# Patient Record
Sex: FEMALE | Race: WHITE | NOT HISPANIC OR LATINO | URBAN - METROPOLITAN AREA
[De-identification: names, ages, dates, MRNs, and addresses within clinical notes are randomized per-mention and may not be internally consistent; named-entity substitution may affect disease eponyms.]

---

## 2023-11-29 ENCOUNTER — TRANSFERRED RECORDS (OUTPATIENT)
Dept: HEALTH INFORMATION MANAGEMENT | Facility: CLINIC | Age: 58
End: 2023-11-29

## 2023-12-13 ENCOUNTER — TRANSFERRED RECORDS (OUTPATIENT)
Dept: HEALTH INFORMATION MANAGEMENT | Facility: CLINIC | Age: 58
End: 2023-12-13
Payer: COMMERCIAL

## 2023-12-18 NOTE — TELEPHONE ENCOUNTER
Action December 17, 2023 9:03 PM MT   Action Taken Sent a request for records and imaging from [facility].   Sent the MRI radiology report from [facility] to scan, imaging done with [facility].       DIAGNOSIS: Left Knee   APPOINTMENT DATE: 12/20/2023   NOTES STATUS DETAILS   OFFICE NOTE from referring provider Media Tab 11/29/2023 - Devendra Dawn MD - [facility]   OFFICE NOTE from other specialist Media Tab 10/16/2023 - Ilda Champagne MD- [facility]   MEDICATION LIST Media Tab    MRI PACS [facility]:  12/13/2023 - LT Knee   XRAYS (IMAGES & REPORTS) PACS [facility]:  01/09/2023 - Left Foot  01/09/2023 - Left Knee

## 2023-12-20 ENCOUNTER — OFFICE VISIT (OUTPATIENT)
Dept: ORTHOPEDICS | Facility: CLINIC | Age: 58
End: 2023-12-20
Payer: COMMERCIAL

## 2023-12-20 ENCOUNTER — PRE VISIT (OUTPATIENT)
Dept: ORTHOPEDICS | Facility: CLINIC | Age: 58
End: 2023-12-20

## 2023-12-20 VITALS — HEIGHT: 65 IN | BODY MASS INDEX: 25.99 KG/M2 | WEIGHT: 156 LBS

## 2023-12-20 DIAGNOSIS — D49.2 NERVE SHEATH TUMOR: Primary | ICD-10-CM

## 2023-12-20 PROCEDURE — 99204 OFFICE O/P NEW MOD 45 MIN: CPT | Performed by: ORTHOPAEDIC SURGERY

## 2023-12-20 RX ORDER — GABAPENTIN 300 MG/1
300 CAPSULE ORAL 3 TIMES DAILY
Qty: 90 CAPSULE | Refills: 1 | Status: SHIPPED | OUTPATIENT
Start: 2023-12-20

## 2023-12-20 NOTE — NURSING NOTE
Pre-Op Teaching was done in person at the clinic.    Teaching Flowsheet   Relevant Diagnosis: Pre-Op Teaching  Teaching Topic:      Person(s) involved in teaching:   Patient     Motivation Level:  Asks Questions: Yes  Eager to Learn: Yes  Cooperative: Yes  Receptive (willing/able to accept information): Yes  Any cultural factors/Muslim beliefs that may influence understanding or compliance? No     Patient demonstrates understanding of the following:  Reason for the appointment, diagnosis and treatment plan: Yes  Knowledge of proper use of medications and conditions for which they are ordered (with special attention to potential side effects or drug interactions): Yes  Which situations necessitate calling provider and whom to contact: Yes- discussed the stoplight tool to help assist with this.      Teaching Concerns Addressed:      Proper use of surgical scrub explain: Yes    Nutritional needs and diet plan: Yes  Pain management techniques: Yes  Wound Care: Yes  How and/when to access community resources: Yes     Instructional Materials Used/Given:  2 bottle of chlorhexidine and a surgery packet given to patient in clinic.      - Important contact info/ phone numbers: emphasizing clinic number and after hours number  - Map/ location of surgery  - Medications to avoid  - Showering instructions  - Stop light tool       -Next step: Schedule a surgery date and schedule a Pre-Op appointment with PCP     Time spent with patient: 15 minutes.

## 2023-12-20 NOTE — LETTER
"    12/20/2023         RE: Peter Verdugo  [address] [facility] [address]        Dear Colleague,    Thank you for referring your patient, Peter Verdugo, to the [facility] [place]. Please see a copy of my visit note below.    Chief Complaint: left leg pain and mass    History: Peter is a 58 year old female here with complaints of left lower leg pain and weakness for one year.  She denies any injury.  She states that the pain is shooting down the back and outside of her lower leg.  It keeps her awake several times a night.  She feels tingling down to the foot at times.  She feels that her leg and ankle are getting weak.  Her ankle occasionally gives out.  She has had to back off on her exercise program due to the symptoms.  She takes tylenol occasionally with minimal relief.  Stretching and moving the leg seem to help.  No other concerns.    PastMedHx: none    PastSurgHx: tonsillectomy; no difficulty with surgery or anesthesia    FamHx: none    Social History     Tobacco Use    Smoking status: Not on file    Smokeless tobacco: Not on file   Substance Use Topics    Alcohol use: Not on file     Meds:   Current Outpatient Medications   Medication    gabapentin (NEURONTIN) 300 MG capsule     No current facility-administered medications for this visit.     Allergies:    Allergies   Allergen Reactions    Aspirin Anaphylaxis    Bee Pollen Unknown     Patient uses  Epi Pen     Review of Systems:   ROS: 10 point ROS neg other than the symptoms noted above in the HPI.    Physical Exam: Ht 1.65 m (5' 4.96\")   Wt 70.8 kg (156 lb)   BMI 25.99 kg/m    Peter is a healthy appearing 58 year old woman who is alert and oriented and in no distress.  She has a minimally antalgic gait without gait assistance.  I am unable to palpate any mass posterior to the fibular head, but this is the area that is most painful for her.  She has no lower extremity edema.  Strength is 5/5 resisted knee flexion " and extension.  Resisted dorsiflexion is 4+/5 with pain.  Resisted plantarflexion is 4/5 with pain.  Resisted inversion is 4/5 with pain, and resisted eversion is 4+/5 with pain.  She is intact to light touch.    Imaging: MRI of the left knee without contrast was reviewed from an outside source.  This shows a 1.6 x 1.4 x 1.2 cm ovoid mass posterior and medial to the proximal tib-fib joint.  This has the appearance of a nerve sheath tumor.  It is adjacent to the vessels of the lower leg.  No associated edema.  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 The rest of the exam is unremarkable.    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  Obviously this has been very symptomatic for her, so we are recommending excision of the mass and sending it to pathology for diagnosis confirmation. This would be an outpatient procedure and she would be weight bearing as tolerated.  There is a chance of nerve injury or irritation which can cause temporary weakness, numbness or nerve pain.  Patient understands this.  There is also risk of artery injury, but we will be careful to visualize the vessels throughout the procedure.  Patient would like to proceed with the surgery.  She was given a packet today and has picked a date.  In the meantime, I have prescribed her gabapentin 300mg TID to use now and after the surgery.  If she has side effects, she can take 1 tab in the AM and 2 tabs at night, or just 2 tabs at night.  All questions answered.  Patient was also examined by Dr. Fitzpatrick and he agrees with the plan of care.     Chief Complaint: left leg pain and mass     History: Peter is a 58 year old female here with complaints of left lower leg pain and weakness for one year.  She denies any injury.  She states that the pain is shooting down the back and outside of her lower leg.  It keeps her awake several  "times a night.  She feels tingling down to the foot at times.  She feels that her leg and ankle are getting weak.  Her ankle occasionally gives out.  She has had to back off on her exercise program due to the symptoms.  She takes tylenol occasionally with minimal relief.  Stretching and moving the leg seem to help.  No other concerns.     PastMedHx: none     PastSurgHx: tonsillectomy; no difficulty with surgery or anesthesia     FamHx: none     Social History           Tobacco Use    Smoking status: Not on file    Smokeless tobacco: Not on file   Substance Use Topics    Alcohol use: Not on file      Meds:       Current Outpatient Medications   Medication    gabapentin (NEURONTIN) 300 MG capsule      No current facility-administered medications for this visit.      Allergies:          Allergies   Allergen Reactions    Aspirin Anaphylaxis    Bee Pollen Unknown       Patient uses  Epi Pen      Review of Systems:   ROS: 10 point ROS neg other than the symptoms noted above in the HPI.     Physical Exam: Ht 1.65 m (5' 4.96\")   Wt 70.8 kg (156 lb)   BMI 25.99 kg/m    Peter is a healthy appearing 58 year old woman who is alert and oriented and in no distress.  She has a minimally antalgic gait without gait assistance.  I am unable to palpate any mass posterior to the fibular head, but this is the area that is most painful for her.  She has no lower extremity edema.  Strength is 5/5 resisted knee flexion and extension.  Resisted dorsiflexion is 4+/5 with pain.  Resisted plantarflexion is 4/5 with pain.  Resisted inversion is 4/5 with pain, and resisted eversion is 4+/5 with pain.  She is intact to light touch.     Imaging: MRI of the left knee without contrast was reviewed from an outside source.  This shows a 1.6 x 1.4 x 1.2 cm ovoid mass posterior and medial to the proximal tib-fib joint.  This has the appearance of a nerve sheath tumor.  It is adjacent to the vessels of the lower leg.  No associated edema.  Grade 2/3 " chondromalacia patella noted. The rest of the exam is unremarkable.     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  Obviously this has been very symptomatic for her, so we are recommending excision of the mass and sending it to pathology for diagnosis confirmation. This would be an outpatient procedure and she would be weight bearing as tolerated.  There is a chance of nerve injury or irritation which can cause temporary weakness, numbness or nerve pain.  Patient understands this.  There is also risk of artery injury, but we will be careful to visualize the vessels throughout the procedure.  Additionally there are small risks of infection deep venous thrombosis and pulmonary embolism.  Patient would like to proceed with the surgery.  She was given a packet today and has picked a date.  In the meantime, I have prescribed her gabapentin 300mg TID to use now and after the surgery.  If she has side effects, she can take 1 tab in the AM and 2 tabs at night, or just 2 tabs at night.  All questions answered.      Andrea Fitzpatrick MD

## 2023-12-20 NOTE — PROGRESS NOTES
"Chief Complaint: left leg pain and mass    History: Peter is a 58 year old female here with complaints of left lower leg pain and weakness for one year.  She denies any injury.  She states that the pain is shooting down the back and outside of her lower leg.  It keeps her awake several times a night.  She feels tingling down to the foot at times.  She feels that her leg and ankle are getting weak.  Her ankle occasionally gives out.  She has had to back off on her exercise program due to the symptoms.  She takes tylenol occasionally with minimal relief.  Stretching and moving the leg seem to help.  No other concerns.    PastMedHx: none    PastSurgHx: tonsillectomy; no difficulty with surgery or anesthesia    FamHx: none    Social History     Tobacco Use    Smoking status: Not on file    Smokeless tobacco: Not on file   Substance Use Topics    Alcohol use: Not on file     Meds:   Current Outpatient Medications   Medication    gabapentin (NEURONTIN) 300 MG capsule     No current facility-administered medications for this visit.     Allergies:    Allergies   Allergen Reactions    Aspirin Anaphylaxis    Bee Pollen Unknown     Patient uses  Epi Pen     Review of Systems:   ROS: 10 point ROS neg other than the symptoms noted above in the HPI.    Physical Exam: Ht 1.65 m (5' 4.96\")   Wt 70.8 kg (156 lb)   BMI 25.99 kg/m    Peter is a healthy appearing 58 year old woman who is alert and oriented and in no distress.  She has a minimally antalgic gait without gait assistance.  I am unable to palpate any mass posterior to the fibular head, but this is the area that is most painful for her.  She has no lower extremity edema.  Strength is 5/5 resisted knee flexion and extension.  Resisted dorsiflexion is 4+/5 with pain.  Resisted plantarflexion is 4/5 with pain.  Resisted inversion is 4/5 with pain, and resisted eversion is 4+/5 with pain.  She is intact to light touch.    Imaging: MRI of the left knee without contrast was " reviewed from an outside source.  This shows a 1.6 x 1.4 x 1.2 cm ovoid mass posterior and medial to the proximal tib-fib joint.  This has the appearance of a nerve sheath tumor.  It is adjacent to the vessels of the lower leg.  No associated edema.  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 The rest of the exam is unremarkable.    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  Obviously this has been very symptomatic for her, so we are recommending excision of the mass and sending it to pathology for diagnosis confirmation. This would be an outpatient procedure and she would be weight bearing as tolerated.  There is a chance of nerve injury or irritation which can cause temporary weakness, numbness or nerve pain.  Patient understands this.  There is also risk of artery injury, but we will be careful to visualize the vessels throughout the procedure.  Patient would like to proceed with the surgery.  She was given a packet today and has picked a date.  In the meantime, I have prescribed her gabapentin 300mg TID to use now and after the surgery.  If she has side effects, she can take 1 tab in the AM and 2 tabs at night, or just 2 tabs at night.  All questions answered.  Patient was also examined by Dr. Fitzpatrick and he agrees with the plan of care.

## 2023-12-20 NOTE — NURSING NOTE
"Reason For Visit:   Chief Complaint   Patient presents with    Consult     Possible schwannoma of left knee referred by Dr. Jorge Dawn // first noticed pain about a year ago        Ht 1.65 m (5' 4.96\")   Wt 70.8 kg (156 lb)   BMI 25.99 kg/m      Pain Assessment  Patient Currently in Pain: Yes  0-10 Pain Scale: 7 (with standing or prolong sitting)  Primary Pain Location: Knee (left)      Chavo Miranda ATC    "

## 2023-12-20 NOTE — PROGRESS NOTES
"Chief Complaint: left leg pain and mass     History: Peter is a 58 year old female here with complaints of left lower leg pain and weakness for one year.  She denies any injury.  She states that the pain is shooting down the back and outside of her lower leg.  It keeps her awake several times a night.  She feels tingling down to the foot at times.  She feels that her leg and ankle are getting weak.  Her ankle occasionally gives out.  She has had to back off on her exercise program due to the symptoms.  She takes tylenol occasionally with minimal relief.  Stretching and moving the leg seem to help.  No other concerns.     PastMedHx: none     PastSurgHx: tonsillectomy; no difficulty with surgery or anesthesia     FamHx: none     Social History           Tobacco Use    Smoking status: Not on file    Smokeless tobacco: Not on file   Substance Use Topics    Alcohol use: Not on file      Meds:       Current Outpatient Medications   Medication    gabapentin (NEURONTIN) 300 MG capsule      No current facility-administered medications for this visit.      Allergies:          Allergies   Allergen Reactions    Aspirin Anaphylaxis    Bee Pollen Unknown       Patient uses  Epi Pen      Review of Systems:   ROS: 10 point ROS neg other than the symptoms noted above in the HPI.     Physical Exam: Ht 1.65 m (5' 4.96\")   Wt 70.8 kg (156 lb)   BMI 25.99 kg/m    Peter is a healthy appearing 58 year old woman who is alert and oriented and in no distress.  She has a minimally antalgic gait without gait assistance.  I am unable to palpate any mass posterior to the fibular head, but this is the area that is most painful for her.  She has no lower extremity edema.  Strength is 5/5 resisted knee flexion and extension.  Resisted dorsiflexion is 4+/5 with pain.  Resisted plantarflexion is 4/5 with pain.  Resisted inversion is 4/5 with pain, and resisted eversion is 4+/5 with pain.  She is intact to light touch.     Imaging: MRI of the left " knee without contrast was reviewed from an outside source.  This shows a 1.6 x 1.4 x 1.2 cm ovoid mass posterior and medial to the proximal tib-fib joint.  This has the appearance of a nerve sheath tumor.  It is adjacent to the vessels of the lower leg.  No associated edema.  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 The rest of the exam is unremarkable.     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  Obviously this has been very symptomatic for her, so we are recommending excision of the mass and sending it to pathology for diagnosis confirmation. This would be an outpatient procedure and she would be weight bearing as tolerated.  There is a chance of nerve injury or irritation which can cause temporary weakness, numbness or nerve pain.  Patient understands this.  There is also risk of artery injury, but we will be careful to visualize the vessels throughout the procedure.  Additionally there are small risks of infection deep venous thrombosis and pulmonary embolism.  Patient would like to proceed with the surgery.  She was given a packet today and has picked a date.  In the meantime, I have prescribed her gabapentin 300mg TID to use now and after the surgery.  If she has side effects, she can take 1 tab in the AM and 2 tabs at night, or just 2 tabs at night.  All questions answered.

## 2023-12-21 ENCOUNTER — TELEPHONE (OUTPATIENT)
Dept: ORTHOPEDICS | Facility: CLINIC | Age: 58
End: 2023-12-21
Payer: COMMERCIAL

## 2023-12-21 PROBLEM — D49.2 NERVE SHEATH TUMOR: Status: ACTIVE | Noted: 2023-12-20

## 2023-12-21 NOTE — TELEPHONE ENCOUNTER
Patient is scheduled for surgery with Dr. Fitzpatrick    Spoke with: Peter    Date of Surgery: 1/4/24    Location: [facility]    Informed patient they will need an adult  Yes    Pre op with Provider PCP, patient will schedule or call back to schedule w/PAC.     Additional imaging/appointments: POP Made    Surgery packet: Received     Additional comments: N/A        Leni Damon on 12/21/2023 at 3:36 PM

## 2023-12-21 NOTE — TELEPHONE ENCOUNTER
Phoned patient to get her scheduled for surgery with Dr. Fitzpatrick    Patient was unavailable,   Provided call back number in voicemail:   [phone] & [phone] for care team.   Will try again.

## 2024-01-02 ENCOUNTER — ANESTHESIA EVENT (OUTPATIENT)
Dept: SURGERY | Facility: AMBULATORY SURGERY CENTER | Age: 59
End: 2024-01-02
Payer: COMMERCIAL

## 2024-01-03 NOTE — ANESTHESIA PREPROCEDURE EVALUATION
"Anesthesia Pre-Procedure Evaluation    Patient: Peter Verdugo   MRN: [id number] : 1965        Procedure : Procedure(s):  Excision left leg mass          No past medical history on file.   No past surgical history on file.   Allergies   Allergen Reactions    Aspirin Anaphylaxis    Bee Pollen Unknown     Patient uses  Epi Pen      Social History     Tobacco Use    Smoking status: Not on file    Smokeless tobacco: Not on file   Substance Use Topics    Alcohol use: Not on file      Wt Readings from Last 1 Encounters:   23 70.8 kg (156 lb)        Anesthesia Evaluation            ROS/MED HX  ENT/Pulmonary:  - neg pulmonary ROS     Neurologic:  - neg neurologic ROS     Cardiovascular:  - neg cardiovascular ROS     METS/Exercise Tolerance: >4 METS    Hematologic:  - neg hematologic  ROS     Musculoskeletal:  - neg musculoskeletal ROS     GI/Hepatic:  - neg GI/hepatic ROS     Renal/Genitourinary:  - neg Renal ROS     Endo:  - neg endo ROS     Psychiatric/Substance Use:  - neg psychiatric ROS     Infectious Disease:  - neg infectious disease ROS     Malignancy:       Other:            Physical Exam    Airway  airway exam normal      Mallampati: I       Respiratory Devices and Support         Dental       (+) Minor Abnormalities - some fillings, tiny chips      Cardiovascular   cardiovascular exam normal          Pulmonary   pulmonary exam normal                OUTSIDE LABS:  CBC: No results found for: \"WBC\", \"HGB\", \"HCT\", \"PLT\"  BMP: No results found for: \"NA\", \"POTASSIUM\", \"CHLORIDE\", \"CO2\", \"BUN\", \"CR\", \"GLC\"  COAGS: No results found for: \"PTT\", \"INR\", \"FIBR\"  POC: No results found for: \"BGM\", \"HCG\", \"HCGS\"  HEPATIC: No results found for: \"ALBUMIN\", \"PROTTOTAL\", \"ALT\", \"AST\", \"GGT\", \"ALKPHOS\", \"BILITOTAL\", \"BILIDIRECT\", \"ODESSA\"  OTHER: No results found for: \"PH\", \"LACT\", \"A1C\", \"RUFUS\", \"PHOS\", \"MAG\", \"LIPASE\", \"AMYLASE\", \"TSH\", \"T4\", \"T3\", \"CRP\", \"SED\"    Anesthesia Plan    ASA Status:  1    NPO Status:  " "NPO Appropriate    Anesthesia Type: General.     - Airway: ETT   Induction: Intravenous, Propofol.   Maintenance: TIVA.        Consents    Anesthesia Plan(s) and associated risks, benefits, and realistic alternatives discussed. Questions answered and patient/representative(s) expressed understanding.     - Discussed: Risks, Benefits and Alternatives for the PROCEDURE were discussed     - Discussed with:  Patient            Postoperative Care    Pain management: IV analgesics, Oral pain medications, Multi-modal analgesia.   PONV prophylaxis: Ondansetron (or other 5HT-3), Dexamethasone or Solumedrol, Background Propofol Infusion     Comments:    Other Comments: Pt nervous and hypertensive today. /90 yesterday in clinic. Pt states that her BP is typically elevated when she goes to the doctor and/or has procedures. Plan to proceed today and treat hypertension as necessary.           Ty Jaffe MD    I have reviewed the pertinent notes and labs in the chart from the past 30 days and (re)examined the patient.  Any updates or changes from those notes are reflected in this note.              # Overweight: Estimated body mass index is 25.99 kg/m  as calculated from the following:    Height as of 12/20/23: 1.65 m (5' 4.96\").    Weight as of 12/20/23: 70.8 kg (156 lb).      "

## 2024-01-04 ENCOUNTER — HOSPITAL ENCOUNTER (OUTPATIENT)
Facility: AMBULATORY SURGERY CENTER | Age: 59
Discharge: HOME | End: 2024-01-04
Attending: ORTHOPAEDIC SURGERY
Payer: COMMERCIAL

## 2024-01-04 ENCOUNTER — ANESTHESIA (OUTPATIENT)
Dept: SURGERY | Facility: AMBULATORY SURGERY CENTER | Age: 59
End: 2024-01-04
Payer: COMMERCIAL

## 2024-01-04 VITALS
BODY MASS INDEX: 25.99 KG/M2 | HEART RATE: 59 BPM | DIASTOLIC BLOOD PRESSURE: 111 MMHG | SYSTOLIC BLOOD PRESSURE: 188 MMHG | RESPIRATION RATE: 12 BRPM | HEIGHT: 65 IN | OXYGEN SATURATION: 99 % | WEIGHT: 156 LBS | TEMPERATURE: 96.9 F

## 2024-01-04 DIAGNOSIS — D49.2 NERVE SHEATH TUMOR: Primary | ICD-10-CM

## 2024-01-04 PROCEDURE — 88307 TISSUE EXAM BY PATHOLOGIST: CPT | Mod: TC | Performed by: ORTHOPAEDIC SURGERY

## 2024-01-04 PROCEDURE — 27328 EXC THIGH/KNEE TUM DEEP <5CM: CPT | Mod: LT | Performed by: ORTHOPAEDIC SURGERY

## 2024-01-04 PROCEDURE — 88307 TISSUE EXAM BY PATHOLOGIST: CPT | Mod: 26 | Performed by: PATHOLOGY

## 2024-01-04 RX ORDER — LIDOCAINE 40 MG/G
CREAM TOPICAL
Status: DISCONTINUED | OUTPATIENT
Start: 2024-01-04 | End: 2024-01-05 | Stop reason: HOSPADM

## 2024-01-04 RX ORDER — OXYCODONE HYDROCHLORIDE 5 MG/1
5 TABLET ORAL EVERY 6 HOURS PRN
Qty: 12 TABLET | Refills: 0 | Status: SHIPPED | OUTPATIENT
Start: 2024-01-04 | End: 2024-01-07

## 2024-01-04 RX ORDER — SODIUM CHLORIDE, SODIUM LACTATE, POTASSIUM CHLORIDE, CALCIUM CHLORIDE 600; 310; 30; 20 MG/100ML; MG/100ML; MG/100ML; MG/100ML
INJECTION, SOLUTION INTRAVENOUS CONTINUOUS
Status: DISCONTINUED | OUTPATIENT
Start: 2024-01-04 | End: 2024-01-05 | Stop reason: HOSPADM

## 2024-01-04 RX ORDER — FENTANYL CITRATE 50 UG/ML
25 INJECTION, SOLUTION INTRAMUSCULAR; INTRAVENOUS EVERY 5 MIN PRN
Status: DISCONTINUED | OUTPATIENT
Start: 2024-01-04 | End: 2024-01-05 | Stop reason: HOSPADM

## 2024-01-04 RX ORDER — ONDANSETRON 4 MG/1
4 TABLET, ORALLY DISINTEGRATING ORAL EVERY 30 MIN PRN
Status: DISCONTINUED | OUTPATIENT
Start: 2024-01-04 | End: 2024-01-05 | Stop reason: HOSPADM

## 2024-01-04 RX ORDER — OXYCODONE HYDROCHLORIDE 5 MG/1
5 TABLET ORAL
Status: COMPLETED | OUTPATIENT
Start: 2024-01-04 | End: 2024-01-04

## 2024-01-04 RX ORDER — LIDOCAINE HYDROCHLORIDE 20 MG/ML
INJECTION, SOLUTION INFILTRATION; PERINEURAL PRN
Status: DISCONTINUED | OUTPATIENT
Start: 2024-01-04 | End: 2024-01-04

## 2024-01-04 RX ORDER — ACETAMINOPHEN 325 MG/1
975 TABLET ORAL ONCE
Status: COMPLETED | OUTPATIENT
Start: 2024-01-04 | End: 2024-01-04

## 2024-01-04 RX ORDER — DEXAMETHASONE SODIUM PHOSPHATE 4 MG/ML
INJECTION, SOLUTION INTRA-ARTICULAR; INTRALESIONAL; INTRAMUSCULAR; INTRAVENOUS; SOFT TISSUE PRN
Status: DISCONTINUED | OUTPATIENT
Start: 2024-01-04 | End: 2024-01-04

## 2024-01-04 RX ORDER — OXYCODONE HYDROCHLORIDE 5 MG/1
10 TABLET ORAL
Status: DISCONTINUED | OUTPATIENT
Start: 2024-01-04 | End: 2024-01-05 | Stop reason: HOSPADM

## 2024-01-04 RX ORDER — GLYCOPYRROLATE 0.2 MG/ML
INJECTION, SOLUTION INTRAMUSCULAR; INTRAVENOUS PRN
Status: DISCONTINUED | OUTPATIENT
Start: 2024-01-04 | End: 2024-01-04

## 2024-01-04 RX ORDER — HYDROMORPHONE HYDROCHLORIDE 1 MG/ML
0.4 INJECTION, SOLUTION INTRAMUSCULAR; INTRAVENOUS; SUBCUTANEOUS EVERY 5 MIN PRN
Status: DISCONTINUED | OUTPATIENT
Start: 2024-01-04 | End: 2024-01-05 | Stop reason: HOSPADM

## 2024-01-04 RX ORDER — AMOXICILLIN 250 MG
1-2 CAPSULE ORAL 2 TIMES DAILY PRN
Qty: 30 TABLET | Refills: 0 | Status: SHIPPED | OUTPATIENT
Start: 2024-01-04

## 2024-01-04 RX ORDER — CEFAZOLIN SODIUM 2 G/50ML
2 SOLUTION INTRAVENOUS SEE ADMIN INSTRUCTIONS
Status: DISCONTINUED | OUTPATIENT
Start: 2024-01-04 | End: 2024-01-05 | Stop reason: HOSPADM

## 2024-01-04 RX ORDER — ACETAMINOPHEN 325 MG/1
650 TABLET ORAL EVERY 4 HOURS PRN
Qty: 50 TABLET | Refills: 0 | Status: SHIPPED | OUTPATIENT
Start: 2024-01-04

## 2024-01-04 RX ORDER — BUPIVACAINE HYDROCHLORIDE 2.5 MG/ML
INJECTION, SOLUTION INFILTRATION; PERINEURAL PRN
Status: DISCONTINUED | OUTPATIENT
Start: 2024-01-04 | End: 2024-01-04 | Stop reason: HOSPADM

## 2024-01-04 RX ORDER — PROPOFOL 10 MG/ML
INJECTION, EMULSION INTRAVENOUS PRN
Status: DISCONTINUED | OUTPATIENT
Start: 2024-01-04 | End: 2024-01-04

## 2024-01-04 RX ORDER — ONDANSETRON 2 MG/ML
INJECTION INTRAMUSCULAR; INTRAVENOUS PRN
Status: DISCONTINUED | OUTPATIENT
Start: 2024-01-04 | End: 2024-01-04

## 2024-01-04 RX ORDER — ONDANSETRON 2 MG/ML
4 INJECTION INTRAMUSCULAR; INTRAVENOUS EVERY 30 MIN PRN
Status: DISCONTINUED | OUTPATIENT
Start: 2024-01-04 | End: 2024-01-05 | Stop reason: HOSPADM

## 2024-01-04 RX ORDER — FENTANYL CITRATE 50 UG/ML
INJECTION, SOLUTION INTRAMUSCULAR; INTRAVENOUS PRN
Status: DISCONTINUED | OUTPATIENT
Start: 2024-01-04 | End: 2024-01-04

## 2024-01-04 RX ORDER — HYDROMORPHONE HYDROCHLORIDE 1 MG/ML
0.2 INJECTION, SOLUTION INTRAMUSCULAR; INTRAVENOUS; SUBCUTANEOUS EVERY 5 MIN PRN
Status: DISCONTINUED | OUTPATIENT
Start: 2024-01-04 | End: 2024-01-05 | Stop reason: HOSPADM

## 2024-01-04 RX ORDER — LABETALOL HYDROCHLORIDE 5 MG/ML
10 INJECTION, SOLUTION INTRAVENOUS
Status: DISCONTINUED | OUTPATIENT
Start: 2024-01-04 | End: 2024-01-05 | Stop reason: HOSPADM

## 2024-01-04 RX ORDER — CEFAZOLIN SODIUM 2 G/50ML
2 SOLUTION INTRAVENOUS
Status: COMPLETED | OUTPATIENT
Start: 2024-01-04 | End: 2024-01-04

## 2024-01-04 RX ORDER — ONDANSETRON 4 MG/1
4 TABLET, ORALLY DISINTEGRATING ORAL EVERY 8 HOURS PRN
Qty: 4 TABLET | Refills: 0 | Status: SHIPPED | OUTPATIENT
Start: 2024-01-04

## 2024-01-04 RX ORDER — FENTANYL CITRATE 50 UG/ML
50 INJECTION, SOLUTION INTRAMUSCULAR; INTRAVENOUS EVERY 5 MIN PRN
Status: DISCONTINUED | OUTPATIENT
Start: 2024-01-04 | End: 2024-01-05 | Stop reason: HOSPADM

## 2024-01-04 RX ORDER — PROPOFOL 10 MG/ML
INJECTION, EMULSION INTRAVENOUS CONTINUOUS PRN
Status: DISCONTINUED | OUTPATIENT
Start: 2024-01-04 | End: 2024-01-04

## 2024-01-04 RX ADMIN — ACETAMINOPHEN 975 MG: 325 TABLET ORAL at 07:53

## 2024-01-04 RX ADMIN — Medication 0.5 MG: at 09:44

## 2024-01-04 RX ADMIN — SODIUM CHLORIDE, SODIUM LACTATE, POTASSIUM CHLORIDE, CALCIUM CHLORIDE: 600; 310; 30; 20 INJECTION, SOLUTION INTRAVENOUS at 09:13

## 2024-01-04 RX ADMIN — LIDOCAINE HYDROCHLORIDE 100 MG: 20 INJECTION, SOLUTION INFILTRATION; PERINEURAL at 09:19

## 2024-01-04 RX ADMIN — ONDANSETRON 4 MG: 2 INJECTION INTRAMUSCULAR; INTRAVENOUS at 09:48

## 2024-01-04 RX ADMIN — HYDROMORPHONE HYDROCHLORIDE 0.2 MG: 1 INJECTION, SOLUTION INTRAMUSCULAR; INTRAVENOUS; SUBCUTANEOUS at 10:29

## 2024-01-04 RX ADMIN — Medication 50 MG: at 09:20

## 2024-01-04 RX ADMIN — OXYCODONE HYDROCHLORIDE 5 MG: 5 TABLET ORAL at 10:46

## 2024-01-04 RX ADMIN — CEFAZOLIN SODIUM 2 G: 2 SOLUTION INTRAVENOUS at 09:13

## 2024-01-04 RX ADMIN — HYDROMORPHONE HYDROCHLORIDE 0.1 MG: 1 INJECTION, SOLUTION INTRAMUSCULAR; INTRAVENOUS; SUBCUTANEOUS at 10:41

## 2024-01-04 RX ADMIN — PROPOFOL 200 MCG/KG/MIN: 10 INJECTION, EMULSION INTRAVENOUS at 09:19

## 2024-01-04 RX ADMIN — PROPOFOL 200 MG: 10 INJECTION, EMULSION INTRAVENOUS at 09:19

## 2024-01-04 RX ADMIN — DEXAMETHASONE SODIUM PHOSPHATE 4 MG: 4 INJECTION, SOLUTION INTRA-ARTICULAR; INTRALESIONAL; INTRAMUSCULAR; INTRAVENOUS; SOFT TISSUE at 09:34

## 2024-01-04 RX ADMIN — HYDROMORPHONE HYDROCHLORIDE 0.2 MG: 1 INJECTION, SOLUTION INTRAMUSCULAR; INTRAVENOUS; SUBCUTANEOUS at 10:36

## 2024-01-04 RX ADMIN — FENTANYL CITRATE 100 MCG: 50 INJECTION, SOLUTION INTRAMUSCULAR; INTRAVENOUS at 09:18

## 2024-01-04 RX ADMIN — GLYCOPYRROLATE 0.2 MG: 0.2 INJECTION, SOLUTION INTRAMUSCULAR; INTRAVENOUS at 09:34

## 2024-01-04 NOTE — BRIEF OP NOTE
[facility] [place]    Brief Operative Note    Pre-operative diagnosis: Nerve sheath tumor [D49.2]  Post-operative diagnosis Same as pre-operative diagnosis    Procedure: Excision left leg mass, Left - Leg    Surgeon: Surgeon(s) and Role:     * Andrea Fitzpatrick MD - Primary     * Gallito Taylor MD - Resident - Assisting  Anesthesia: General   Estimated Blood Loss: Less than 10 ml    Drains: None  Specimens:   ID Type Source Tests Collected by Time Destination   1 : left leg mass Tissue Leg, left SURGICAL PATHOLOGY EXAM Andrea Fitzpatrick MD 1/4/2024  9:47 AM      Findings:   None.  Complications: None.  Implants: * No implants in log *        Gallito Taylor MD  Orthopedic Surgery PGY4

## 2024-01-04 NOTE — OP NOTE
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  She understands the risks of procedure including the infection pain numbness tingling limp venous thrombosis pulmonary embolism.    DESCRIPTION OF PROCEDURE: The patient with successful induction of anesthesia.  He was turned prone and her arms legs and chest and pelvis were well-padded.  The left leg was washed and sterilely prepped and draped.  We made an incision over the posterior musculature at the level of the fibular head.  After incising skin sharply divided subcutaneous tissue with cautery we opened up the fascia.  I then bluntly dissected in between the gastrocnemius heads down to the deep posterior compartment or Achilles within the compartment.  I bluntly dissected the muscle in that area and identified the tumor.  I bluntly dissected around the tumor and was able to  from surrounding soft tissues and remove it in 1 piece.  It was not associated with the major nerve.  We then cauterized the small being vessels irrigated and closed.  0 Vicryl was used in the fascia followed by 2-0 Vicryl in the subcutaneous tissue Monocryl and skin Steri-Strips and a sterile dry dressing but the patient was extubated and taken to the recovery room in stable condition.  There were no complications.  I was present for all critical portions of the procedure.  The estimated blood loss is 5 mL    Andrea Fitzpatrick MD

## 2024-01-04 NOTE — ANESTHESIA PROCEDURE NOTES
Airway       Patient location during procedure: OR       Procedure Start/Stop Times: 1/4/2024 9:22 AM  Staff -        Performed By: CRNAIndications and Patient Condition       Indications for airway management: yoli-procedural       Induction type:intravenous       Mask difficulty assessment: 1 - vent by mask    Final Airway Details       Final airway type: endotracheal airway       Successful airway: ETT - single  Endotracheal Airway Details        ETT size (mm): 7.0       Successful intubation technique: direct laryngoscopy       DL Blade Type: Villa 2       Grade View of Cords: 1       Adjucts: stylet       Position: Right       Measured from: lips       Secured at (cm): 22       Bite block used: None    Post intubation assessment        Placement verified by: capnometry, equal breath sounds and chest rise        Number of attempts at approach: 1       Secured with: tape       Ease of procedure: easy       Dentition: Intact and Unchanged    Medication(s) Administered   Medication Administration Time: 1/4/2024 9:22 AM

## 2024-01-04 NOTE — ANESTHESIA CARE TRANSFER NOTE
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    Anesthesia Type:   General     Note:    Oropharynx: oropharynx clear of all foreign objects and spontaneously breathing  Level of Consciousness: awake  Oxygen Supplementation: face mask  Level of Supplemental Oxygen (L/min / FiO2): 6  Independent Airway: airway patency satisfactory and stable    Vital Signs Stable: post-procedure vital signs reviewed and stable  Report to RN Given: handoff report given  Patient transferred to: PACU    Handoff Report: Identifed the Patient, Identified the Reponsible Provider, Reviewed the pertinent medical history, Discussed the surgical course, Reviewed Intra-OP anesthesia mangement and issues during anesthesia, Set expectations for post-procedure period and Allowed opportunity for questions and acknowledgement of understanding      Vitals:  Vitals Value Taken Time   /109 01/04/24 1015   Temp 96.8    Pulse 92 01/04/24 1018   Resp 7 01/04/24 1018   SpO2 99 % 01/04/24 1018   Vitals shown include unfiled device data.    Electronically Signed By: NATALIIA Monaco CRNA  January 4, 2024  10:19 AM

## 2024-01-04 NOTE — DISCHARGE INSTRUCTIONS
[facility]  Home Care Following Anesthesia  For 24 hours after surgery:  Get plenty of rest.  A responsible adult must stay with you for at least 24 hours after you leave the surgery center.  Do not drive or use heavy equipment.  If you have weakness or tingling, don't drive or use heavy equipment until this feeling goes away.   Do not drink alcohol.   Avoid strenuous or risky activities.  Ask for help when climbing stairs.  You may feel lightheaded.  IF so, sit for a few minutes before standing.  Have someone help you get up.   If you have nausea (feel sick to your stomach): Drink only clear liquids such as apple juice, ginger ale, broth or 7-Up.  Rest may also help.  Be sure to drink enough fluids.  Move to a regular diet as you feel able.   You may have a slight fever.  Call the doctor if your fever is over 100 F (37.7 C) (taken under the tongue) or lasts longer than 24 hours.  You may have a dry mouth, a sore throat, muscle aches or trouble sleeping. These should go away after 24 hours.  Do not make important or legal decisions.   It is recommended to avoid smoking.               Tips for taking pain medications  To get the best pain relief possible, remember these points:  Take pain medications as directed, before pain becomes severe.  Pain medication can upset your stomach: taking it with food may help.  Constipation is a common side effect of pain medication. Drink plenty of  fluids.  Eat foods high in fiber. Take a stool softener if recommended by your doctor or pharmacist.  Do not drink alcohol, drive or operate machinery while taking pain medications.  Ask about other ways to control pain, such as with heat, ice or relaxation.    Tylenol/Acetaminophen Consumption    If you feel your pain relief is insufficient, you may take Tylenol/Acetaminophen in addition to your narcotic pain medication.   Be careful not to exceed 4,000 mg of Tylenol/Acetaminophen in a 24 hour  period from all sources.  If you are taking extra strength Tylenol/acetaminophen (500 mg), the maximum dose is 8 tablets in 24 hours.  If you are taking regular strength acetaminophen (325 mg), the maximum dose is 12 tablets in 24 hours.    Call a doctor for any of the following:  Signs of infection (fever, growing tenderness at the surgery site, a large amount of drainage or bleeding, severe pain, foul-smelling drainage, redness, swelling).  It has been over 8 to 10 hours since surgery and you are still not able to urinate (pass water).  Headache for over 24 hours.  Numbness, tingling or weakness the day after surgery (if you had spinal anesthesia).  Signs of Covid-19 infection (temperature over 100 degrees, shortness of breath, cough, loss of taste/smell, generalized body aches, persistent headache, chills, sore throat, nausea/vomiting/diarrhea)  Your doctor is:       Dr. Andrea Fitzpatrick, Orthopaedics: [phone]               Or dial [phone] and ask for the resident on call for:  Orthopaedics  For emergency care, call the:  [facility] Emergency Department: [phone] (TTY for hearing impaired: [phone])

## 2024-01-04 NOTE — ANESTHESIA POSTPROCEDURE EVALUATION
Patient: Peter Verdugo    Procedure: Procedure(s):  Excision left leg mass       Anesthesia Type:  General    Note:  Disposition: Outpatient   Postop Pain Control: Uneventful            Sign Out: Well controlled pain   PONV: No   Neuro/Psych: Uneventful            Sign Out: Acceptable/Baseline neuro status   Airway/Respiratory: Uneventful            Sign Out: Acceptable/Baseline resp. status   CV/Hemodynamics: Uneventful            Sign Out: Acceptable CV status; No obvious hypovolemia; No obvious fluid overload   Other NRE: NONE   DID A NON-ROUTINE EVENT OCCUR?            Last vitals:  Vitals Value Taken Time   /112 01/04/24 1030   Temp 36.1  C (96.9  F) 01/04/24 1030   Pulse 85 01/04/24 1039   Resp 0 01/04/24 1039   SpO2 96 % 01/04/24 1039   Vitals shown include unfiled device data.    Electronically Signed By: Ty Jaffe MD  January 4, 2024  3:05 PM

## 2024-01-08 LAB
PATH REPORT.COMMENTS IMP SPEC: NORMAL
PATH REPORT.FINAL DX SPEC: NORMAL
PATH REPORT.GROSS SPEC: NORMAL
PATH REPORT.MICROSCOPIC SPEC OTHER STN: NORMAL
PATH REPORT.RELEVANT HX SPEC: NORMAL
PHOTO IMAGE: NORMAL

## 2024-01-11 ENCOUNTER — TELEPHONE (OUTPATIENT)
Dept: ORTHOPEDICS | Facility: CLINIC | Age: 59
End: 2024-01-11
Payer: COMMERCIAL

## 2024-01-11 NOTE — TELEPHONE ENCOUNTER
- A call was placed to the patient.     - Patient is having some swelling. Being you are only a couple weeks after surgery is very common to still have pain at the surgical site.  Red flags would be increased redness, increased swelling, fever, feeling ill, locking of the knee, and pain with weightbearing.  If you are not experiencing any of these red flag symptoms I would recommend continuing to observe and doing what you are doing for pain control.  If you are concerned we be more than happy to see you sooner for your postoperative appointment.     - Patient verbalized understanding of plan and all questions were answered. Call back number to clinic was given and patient was told to call if they had an further questions.

## 2024-01-11 NOTE — TELEPHONE ENCOUNTER
Hello,    I'm with ortho con.  Pt of Dr. Fitzpatrick had surgery on her left knee.  She is having some swelling that started yesterday.  She doesn't notice any warmth.  She is not having pain.  Swelling in ankle and calf.  Has not removed bandage yet.  Please give her a call.  No fever.  Ph# [phone].

## 2024-01-24 ENCOUNTER — OFFICE VISIT (OUTPATIENT)
Dept: ORTHOPEDICS | Facility: CLINIC | Age: 59
End: 2024-01-24
Payer: COMMERCIAL

## 2024-01-24 DIAGNOSIS — D49.2 CARTILAGINOUS NEOPLASM: Primary | ICD-10-CM

## 2024-01-24 PROCEDURE — 99024 POSTOP FOLLOW-UP VISIT: CPT | Performed by: PHYSICIAN ASSISTANT

## 2024-01-24 NOTE — PROGRESS NOTES
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 Fitzpatrick.  Patient reports that she is doing fantastic.  All of her pain and symptoms from before surgery are gone.  She only took 1 day of pain medicine.  She is back to exercising.  She is back to sleeping well.  She is very happy with the result.  No other concerns.    Physical Exam: Peter is a 58-year-old female who is alert and oriented no apparent distress.  She has a nonantalgic reciprocal gait without gait assistance.  Her left posterior calf incision is healing well with no erythema or drainage.  Minimal swelling.  No ecchymosis.  No calf tenderness.  She has full knee and ankle range of motion today    Pathology:   Final Diagnosis   Soft tissue, left leg, excision:  - Low-grade lobulated cartilaginous neoplasm, see comment   Electronically signed by Margto Eastman MD on 1/8/2024 at  3:14 PM   Comment  [email or web address]   The differential diagnosis includes soft tissue chondroma.  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     Impression: 58-year-old female doing well status post excision of left lower leg mass, consistent with low-grade cartilaginous neoplasm, symptoms greatly improved    Plan: I explained to the patient she can shower and get the incision wet.  No soaking in tub or pool for another week.  She can gradually increase her activities as tolerated.  I would like to clarify with Dr. Fitzpatrick and our pathologist exactly what this tumor is, as we were expecting it to be a peripheral nerve sheath tumor.  There is a higher rate of recurrence for cartilage type tumor.  Either way it is  benign, but I will send her a message to let her know if we require any further follow-up.  Otherwise she can follow-up as needed or if she has any similar symptoms.  She agrees with plan and all questions have been answered today.

## 2024-01-24 NOTE — LETTER
1/24/2024         RE: Peter Verdugo  [address] [facility] [address]        Dear Colleague,    Thank you for referring your patient, Peter Verdugo, to the [facility]. Please see a copy of my visit note below.    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 Fitzpatrick.  Patient reports that she is doing fantastic.  All of her pain and symptoms from before surgery are gone.  She only took 1 day of pain medicine.  She is back to exercising.  She is back to sleeping well.  She is very happy with the result.  No other concerns.    Physical Exam: Peter is a 58-year-old female who is alert and oriented no apparent distress.  She has a nonantalgic reciprocal gait without gait assistance.  Her left posterior calf incision is healing well with no erythema or drainage.  Minimal swelling.  No ecchymosis.  No calf tenderness.  She has full knee and ankle range of motion today    Pathology:   Final Diagnosis   Soft tissue, left leg, excision:  - Low-grade lobulated cartilaginous neoplasm, see comment   Electronically signed by Margot Eastman MD on 1/8/2024 at  3:14 PM   Comment  [email or web address]   The differential diagnosis includes soft tissue chondroma.  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     Impression: 58-year-old female doing well status post excision of left lower leg mass, consistent with low-grade cartilaginous neoplasm, symptoms greatly improved    Plan: I explained to the patient she can shower and get the incision wet.  No soaking in tub or pool for another week.  She can gradually increase  her activities as tolerated.  I would like to clarify with Dr. Fitzpatrick and our pathologist exactly what this tumor is, as we were expecting it to be a peripheral nerve sheath tumor.  There is a higher rate of recurrence for cartilage type tumor.  Either way it is benign, but I will send her a message to let her know if we require any further follow-up.  Otherwise she can follow-up as needed or if she has any similar symptoms.  She agrees with plan and all questions have been answered today.        Ana Rosa Garcia PA-C

## 2024-01-27 ENCOUNTER — HEALTH MAINTENANCE LETTER (OUTPATIENT)
Age: 59
End: 2024-01-27

## 2024-06-15 ENCOUNTER — HEALTH MAINTENANCE LETTER (OUTPATIENT)
Age: 59
End: 2024-06-15

## 2025-02-01 ENCOUNTER — HEALTH MAINTENANCE LETTER (OUTPATIENT)
Age: 60
End: 2025-02-01

## (undated) DEVICE — PREP CHLORAPREP 26ML TINTED ORANGE  260815

## (undated) DEVICE — PREP SKIN SCRUB TRAY 4461A

## (undated) DEVICE — ESU GROUND PAD ADULT W/CORD E7507

## (undated) DEVICE — SU VICRYL 2-0 SH 27" UND J417H

## (undated) DEVICE — SOL NACL 0.9% IRRIG 500ML BOTTLE 2F7123

## (undated) DEVICE — SU MONOCRYL 4-0 PS-2 27" UND Y426H

## (undated) DEVICE — DRAPE STERI U 1015

## (undated) DEVICE — DRAPE CONVERTORS U-DRAPE 60X72" 8476

## (undated) DEVICE — PREP POVIDONE-IODINE 7.5% SCRUB 4OZ BOTTLE MDS093945

## (undated) DEVICE — CAST PADDING 4" UNSTERILE 9044

## (undated) DEVICE — SPECIMEN CONTAINER 5OZ STERILE 2600SA

## (undated) DEVICE — GOWN XLG DISP 9545

## (undated) DEVICE — LINEN ORTHO PACK 5446

## (undated) DEVICE — SUCTION MANIFOLD NEPTUNE 2 SYS 1 PORT 702-025-000

## (undated) DEVICE — GLOVE BIOGEL PI MICRO SZ 7.0 48570

## (undated) RX ORDER — CEFAZOLIN SODIUM 2 G/50ML
SOLUTION INTRAVENOUS
Status: DISPENSED
Start: 2024-01-04

## (undated) RX ORDER — ACETAMINOPHEN 325 MG/1
TABLET ORAL
Status: DISPENSED
Start: 2024-01-04

## (undated) RX ORDER — BUPIVACAINE HYDROCHLORIDE 2.5 MG/ML
INJECTION, SOLUTION EPIDURAL; INFILTRATION; INTRACAUDAL
Status: DISPENSED
Start: 2024-01-04

## (undated) RX ORDER — OXYCODONE HYDROCHLORIDE 5 MG/1
TABLET ORAL
Status: DISPENSED
Start: 2024-01-04

## (undated) RX ORDER — FENTANYL CITRATE 50 UG/ML
INJECTION, SOLUTION INTRAMUSCULAR; INTRAVENOUS
Status: DISPENSED
Start: 2024-01-04

## (undated) RX ORDER — HYDROMORPHONE HYDROCHLORIDE 1 MG/ML
INJECTION, SOLUTION INTRAMUSCULAR; INTRAVENOUS; SUBCUTANEOUS
Status: DISPENSED
Start: 2024-01-04